# Patient Record
Sex: MALE | Race: OTHER | HISPANIC OR LATINO
[De-identification: names, ages, dates, MRNs, and addresses within clinical notes are randomized per-mention and may not be internally consistent; named-entity substitution may affect disease eponyms.]

---

## 2017-12-15 ENCOUNTER — APPOINTMENT (OUTPATIENT)
Dept: INTERNAL MEDICINE | Facility: CLINIC | Age: 27
End: 2017-12-15

## 2018-02-16 ENCOUNTER — OUTPATIENT (OUTPATIENT)
Dept: OUTPATIENT SERVICES | Facility: HOSPITAL | Age: 28
LOS: 1 days | Discharge: HOME | End: 2018-02-16

## 2018-02-16 ENCOUNTER — APPOINTMENT (OUTPATIENT)
Dept: INTERNAL MEDICINE | Facility: CLINIC | Age: 28
End: 2018-02-16

## 2018-02-16 ENCOUNTER — LABORATORY RESULT (OUTPATIENT)
Age: 28
End: 2018-02-16

## 2018-02-16 VITALS
DIASTOLIC BLOOD PRESSURE: 70 MMHG | HEIGHT: 64.5 IN | WEIGHT: 182 LBS | SYSTOLIC BLOOD PRESSURE: 109 MMHG | BODY MASS INDEX: 30.69 KG/M2 | HEART RATE: 58 BPM

## 2018-02-16 DIAGNOSIS — Z87.891 PERSONAL HISTORY OF NICOTINE DEPENDENCE: ICD-10-CM

## 2018-02-16 DIAGNOSIS — Z87.39 PERSONAL HISTORY OF OTHER DISEASES OF THE MUSCULOSKELETAL SYSTEM AND CONNECTIVE TISSUE: ICD-10-CM

## 2018-02-26 DIAGNOSIS — L65.9 NONSCARRING HAIR LOSS, UNSPECIFIED: ICD-10-CM

## 2018-02-26 DIAGNOSIS — Z23 ENCOUNTER FOR IMMUNIZATION: ICD-10-CM

## 2018-03-29 ENCOUNTER — EMERGENCY (EMERGENCY)
Facility: HOSPITAL | Age: 28
LOS: 0 days | Discharge: HOME | End: 2018-03-29
Attending: EMERGENCY MEDICINE | Admitting: INTERNAL MEDICINE

## 2018-03-29 VITALS
DIASTOLIC BLOOD PRESSURE: 82 MMHG | SYSTOLIC BLOOD PRESSURE: 118 MMHG | OXYGEN SATURATION: 98 % | RESPIRATION RATE: 18 BRPM | TEMPERATURE: 98 F | HEART RATE: 61 BPM

## 2018-03-29 DIAGNOSIS — R10.32 LEFT LOWER QUADRANT PAIN: ICD-10-CM
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
## 2018-03-29 DIAGNOSIS — S39.011A STRAIN OF MUSCLE, FASCIA AND TENDON OF ABDOMEN, INITIAL ENCOUNTER: ICD-10-CM
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
## 2018-03-29 NOTE — ED PROVIDER NOTE - NS ED ROS FT
Review of Systems:  	•	CONSTITUTIONAL - no fever, no diaphoresis, no chills  	•	SKIN - no rash  	•	HEMATOLOGIC - no bleeding, no bruising  	•	RESPIRATORY - no shortness of breath, no cough  	•	CARDIAC - no chest pain, no palpitations  	•	GI - + abd pain, no nausea, no vomiting, no diarrhea, no constipation  	•	GENITO-URINARY - no dysuria; no hematuria, no increased urinary frequency, no testicular pain  	•	MUSCULOSKELETAL - no joint paint, no swelling, no redness  	•	NEUROLOGIC - no weakness, no headache, no paresthesias, no LOC

## 2018-03-29 NOTE — ED PROVIDER NOTE - CHPI ED SYMPTOMS NEG
no nausea/no dysuria/no hematuria/no chills/no blood in stool/no diarrhea/no fever/no vomiting/no abdominal distension/no burning urination
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
## 2018-03-29 NOTE — ED PROVIDER NOTE - PHYSICAL EXAMINATION
VITAL SIGNS: I have reviewed nursing notes and confirm.  CONSTITUTIONAL: Well-developed; well-nourished; in no acute distress.  SKIN: Skin exam is warm and dry, no acute rash.  HEAD: Normocephalic; atraumatic.  EYES: PERRL, EOM intact; conjunctiva and sclera clear.  ENT: No nasal discharge; airway clear.   NECK: Supple; non tender.  CARD: S1, S2 normal; no murmurs, gallops, or rubs. Regular rate and rhythm.  RESP: Clear to auscultation bilaterally. No wheezes, rales or rhonchi.  ABD: Normal bowel sounds; soft; non-distended; non-tender. No hernia appreciated. No CVA tenderness.    chaperoned by nurse White: Normal appearing male genitalia. No hernia appreciated. No testicular tenderness. No discharge noted.   No erythema or swelling noted.   EXT: Normal ROM.   LYMPH: No acute cervical adenopathy.  NEURO: Alert, oriented. Grossly unremarkable. No focal deficits.  PSYCH: Cooperative, appropriate.

## 2018-03-29 NOTE — ED PROVIDER NOTE - ATTENDING CONTRIBUTION TO CARE
26 yo  M who is a  presents with left lower abd/groin pain that he only experiences with movement, lifting something heavy or twisting.  No dysuria, no urinary complaints.  No diarrhea, no fevers, no chills, no sob.  No  complaints, no testicle pain or swelling or redness.  Physician who speaks [place] translated for me.    Pt has had similar symptoms in the past as well only with movement or heavy lifting.  Pt has NO PAIN AT REST.  No nausea, no vomiting.    Awake, alert.  Neck supple.  Abd soft with Mild LLQ/Inguinal area mild tenderness, nor ebound, no guarding, no hernmia appreciated.  : no testicle swelling or erythema, no redness, no swelling, no hernia appreciated.  a/p:  History is concerning for possible muscle strain vs. hernia.  I spoke to pt at length about possibility of hernia.  Pt given strict return precautions.  Pt given surgery clinic follow up.  Pt given strict dc insturctions.  Pt is very well appearing, nontoxic.

## 2018-03-29 NOTE — ED PROVIDER NOTE - OBJECTIVE STATEMENT
26 yo M with no pmhx 26 yo M with no pmhx presenting with pain to left lower quadrant/inguinal region that started yesterday morning while heavy lifting at work. States pain is worse with movement and better with rest. Pt reports having this similar pain 3 weeks ago that occurred while heavy lifting but resolved on its own.    Pt Welsh speaking received history with Welsh speaking nurse Cindy pressley with me.

## 2018-03-29 NOTE — ED PROVIDER NOTE - MEDICAL DECISION MAKING DETAILS
Pt with muscle strain vs. hernia.  pt dc [facility] outpatient surgery follow up.  pt nontoxic, well appearing.  pt understands importance of follow up.  pt given strict return precuations.  no fevers, no chills, no nausea, no vomiting.  pt has NO PAIN at rest.  Physician who speaks [place]serr[place]ian translated and provided dc instructions.  pt to follow up with surgery

## 2018-04-02 ENCOUNTER — APPOINTMENT (OUTPATIENT)
Dept: SURGERY | Facility: CLINIC | Age: 28
End: 2018-04-02
Payer: SUBSIDIZED

## 2018-04-02 DIAGNOSIS — S76.211A STRAIN OF ADDUCTOR MUSCLE, FASCIA AND TENDON OF RIGHT THIGH, INITIAL ENCOUNTER: ICD-10-CM

## 2018-04-02 PROCEDURE — 99203 OFFICE O/P NEW LOW 30 MIN: CPT

## 2018-08-09 ENCOUNTER — OUTPATIENT (OUTPATIENT)
Dept: OUTPATIENT SERVICES | Facility: HOSPITAL | Age: 28
LOS: 1 days | Discharge: HOME | End: 2018-08-09

## 2018-08-09 ENCOUNTER — APPOINTMENT (OUTPATIENT)
Dept: INTERNAL MEDICINE | Facility: CLINIC | Age: 28
End: 2018-08-09

## 2018-08-09 VITALS
WEIGHT: 186 LBS | HEIGHT: 64 IN | DIASTOLIC BLOOD PRESSURE: 71 MMHG | SYSTOLIC BLOOD PRESSURE: 118 MMHG | TEMPERATURE: 97.2 F | HEART RATE: 55 BPM | BODY MASS INDEX: 31.76 KG/M2

## 2018-08-09 DIAGNOSIS — K08.89 OTHER SPECIFIED DISORDERS OF TEETH AND SUPPORTING STRUCTURES: ICD-10-CM

## 2018-08-09 DIAGNOSIS — S76.212A STRAIN OF ADDUCTOR MUSCLE, FASCIA AND TENDON OF LEFT THIGH, INITIAL ENCOUNTER: ICD-10-CM

## 2018-08-09 DIAGNOSIS — R10.32 LEFT LOWER QUADRANT PAIN: ICD-10-CM

## 2018-08-09 NOTE — HISTORY OF PRESENT ILLNESS
[FreeTextEntry1] : Hair loss is getting worse  [de-identified] : 29 yo Male patient with PMH of hair loss presents to clinic for follow up. Pt states hair loss is getting worse than before he tried medication- he cannot remember the name of medication. Also pt complaints of dry skin on left heel. Pt has pain on left lower extremities. It started about 5 years ago and pt does not remember any trauma. Pt also wants blood work done to check if he is healthy.

## 2018-08-09 NOTE — HISTORY OF PRESENT ILLNESS
[FreeTextEntry1] : Hair loss is getting worse  [de-identified] : 27 yo Male patient with PMH of hair loss presents to clinic for follow up. Pt states hair loss is getting worse than before he tried medication- he cannot remember the name of medication. Also pt complaints of dry skin on left heel. Pt has pain on left lower extremities. It started about 5 years ago and pt does not remember any trauma. Pt also wants blood work done to check if he is healthy.

## 2018-08-09 NOTE — PLAN
[FreeTextEntry1] : # Hair loss\par - Rogaine advised again. Refer to derma if no improvement. \par \par # dry skin on left foot\par - Ammonium lactate lotion. \par \par # Vitamin d Def:\par OTC vitamin D advised. \par \par # HLD: Diet and exercise advised. \par \par RTC in 6 months.

## 2018-08-09 NOTE — END OF VISIT
[FreeTextEntry3] : Case d/w MS (Ginette)  [Time Spent: ___ minutes] : I have spent [unfilled] minutes of face to face time with the patient

## 2018-08-09 NOTE — REVIEW OF SYSTEMS
[Negative] : Heme/Lymph [FreeTextEntry9] : left tibia medial side of leg [de-identified] : dry skin on left foot

## 2018-08-09 NOTE — REVIEW OF SYSTEMS
[Negative] : Heme/Lymph [FreeTextEntry9] : left tibia medial side of leg [de-identified] : dry skin on left foot

## 2018-11-15 ENCOUNTER — APPOINTMENT (OUTPATIENT)
Dept: INTERNAL MEDICINE | Facility: CLINIC | Age: 28
End: 2018-11-15

## 2018-11-15 ENCOUNTER — LABORATORY RESULT (OUTPATIENT)
Age: 28
End: 2018-11-15

## 2018-11-15 ENCOUNTER — OUTPATIENT (OUTPATIENT)
Dept: OUTPATIENT SERVICES | Facility: HOSPITAL | Age: 28
LOS: 1 days | Discharge: HOME | End: 2018-11-15

## 2018-11-15 VITALS
TEMPERATURE: 97.9 F | DIASTOLIC BLOOD PRESSURE: 74 MMHG | WEIGHT: 183 LBS | BODY MASS INDEX: 31.24 KG/M2 | HEIGHT: 64 IN | HEART RATE: 67 BPM | SYSTOLIC BLOOD PRESSURE: 115 MMHG

## 2018-11-15 DIAGNOSIS — L65.9 NONSCARRING HAIR LOSS, UNSPECIFIED: ICD-10-CM

## 2018-11-15 DIAGNOSIS — E55.9 VITAMIN D DEFICIENCY, UNSPECIFIED: ICD-10-CM

## 2018-11-15 DIAGNOSIS — E78.5 HYPERLIPIDEMIA, UNSPECIFIED: ICD-10-CM

## 2018-11-15 DIAGNOSIS — Z00.00 ENCOUNTER FOR GENERAL ADULT MEDICAL EXAMINATION W/OUT ABNORMAL FINDINGS: ICD-10-CM

## 2018-11-15 LAB
BASOPHILS # BLD AUTO: 0.02 K/UL
BASOPHILS NFR BLD AUTO: 0.3 %
EOSINOPHIL # BLD AUTO: 0.07 K/UL
EOSINOPHIL NFR BLD AUTO: 1 %
HCT VFR BLD CALC: 51.3 %
HGB BLD-MCNC: 17.2 G/DL
IMM GRANULOCYTES NFR BLD AUTO: 0.4 %
LYMPHOCYTES # BLD AUTO: 2.59 K/UL
LYMPHOCYTES NFR BLD AUTO: 38.3 %
MAN DIFF?: NORMAL
MCHC RBC-ENTMCNC: 29.9 PG
MCHC RBC-ENTMCNC: 33.5 G/DL
MCV RBC AUTO: 89.2 FL
MONOCYTES # BLD AUTO: 0.49 K/UL
MONOCYTES NFR BLD AUTO: 7.2 %
NEUTROPHILS # BLD AUTO: 3.56 K/UL
NEUTROPHILS NFR BLD AUTO: 52.8 %
PLATELET # BLD AUTO: 263 K/UL
RBC # BLD: 5.75 M/UL
RBC # FLD: 12.7 %
WBC # FLD AUTO: 6.76 K/UL

## 2018-11-15 NOTE — REVIEW OF SYSTEMS
[Nail Changes] : nail changes [Negative] : Heme/Lymph [de-identified] : Black toe nail at RIGHT 1st digit

## 2018-11-15 NOTE — HISTORY OF PRESENT ILLNESS
[FreeTextEntry1] :  [id number] [FreeTextEntry2] : Rebeca [de-identified] : The patient is a 28 year old male who presents to the clinic for follow up examination. He reports having continued hair loss despite reattempting Rogain. He notices the hair loss when he wakes up in bed and in the shower mostly. He has no family Hx of hair loss. He also complains of toe nail changes, particular in his RIGHT great toe. He reports in part turning black. Otherwise his ROS is negative.\par

## 2018-11-15 NOTE — PLAN
[FreeTextEntry1] : This is a 28 year old male with a past medical history of hair loss. He reports having continued hair loss despite reattempting Rogain. He notices the hair loss when he wakes up in bed and in the shower mostly. He has no family Hx of hair loss. He also complains of toe nail changes, particular in his RIGHT great toe. He reports in part turning black. Otherwise his ROS is negative.\par \par Hair loss\par - No apparent hair loss of physical examination\par - Reattempted Rogain advised from last visit; advised to continue\par - He did not follow up with dermatology, as recommended\par - He did not complete the blood tests prior to this visit as well\par - Will check Thyroid studies \par \par Tinea ungum BL\par - Refer to podiatry\par \par HLD\par - Follow up with lipid panel\par - No on medications\par \par HCM\par - Flu vaccine received today\par - Will order CBC, CMP, A1c, TSH, lipid panel, Vit D, and ESR\par

## 2018-11-15 NOTE — PHYSICAL EXAM

## 2018-11-16 LAB
25(OH)D3 SERPL-MCNC: 23 NG/ML
ALBUMIN SERPL ELPH-MCNC: 4.9 G/DL
ALP BLD-CCNC: 93 U/L
ALT SERPL-CCNC: 38 U/L
ANION GAP SERPL CALC-SCNC: 12 MMOL/L
AST SERPL-CCNC: 25 U/L
BILIRUB SERPL-MCNC: 0.7 MG/DL
BUN SERPL-MCNC: 10 MG/DL
CALCIUM SERPL-MCNC: 9.6 MG/DL
CHLORIDE SERPL-SCNC: 100 MMOL/L
CHOLEST SERPL-MCNC: 225 MG/DL
CHOLEST/HDLC SERPL: 5 RATIO
CO2 SERPL-SCNC: 26 MMOL/L
CREAT SERPL-MCNC: 0.9 MG/DL
ERYTHROCYTE [SEDIMENTATION RATE] IN BLOOD BY WESTERGREN METHOD: 2 MM/HR
ESTIMATED AVERAGE GLUCOSE: 100 MG/DL
GLUCOSE SERPL-MCNC: 85 MG/DL
HBA1C MFR BLD HPLC: 5.1 %
HDLC SERPL-MCNC: 45 MG/DL
LDLC SERPL CALC-MCNC: 157 MG/DL
POTASSIUM SERPL-SCNC: 4.4 MMOL/L
PROT SERPL-MCNC: 7.4 G/DL
SODIUM SERPL-SCNC: 138 MMOL/L
TRIGL SERPL-MCNC: 207 MG/DL
TSH SERPL-ACNC: 1.03 UIU/ML

## 2018-11-19 DIAGNOSIS — Z23 ENCOUNTER FOR IMMUNIZATION: ICD-10-CM

## 2018-11-19 DIAGNOSIS — B35.1 TINEA UNGUIUM: ICD-10-CM

## 2018-11-19 DIAGNOSIS — L65.9 NONSCARRING HAIR LOSS, UNSPECIFIED: ICD-10-CM

## 2018-11-30 ENCOUNTER — OUTPATIENT (OUTPATIENT)
Dept: OUTPATIENT SERVICES | Facility: HOSPITAL | Age: 28
LOS: 1 days | Discharge: HOME | End: 2018-11-30

## 2018-11-30 ENCOUNTER — APPOINTMENT (OUTPATIENT)
Dept: PODIATRY | Facility: CLINIC | Age: 28
End: 2018-11-30
Payer: SUBSIDIZED

## 2018-11-30 VITALS
HEIGHT: 64 IN | BODY MASS INDEX: 33.12 KG/M2 | DIASTOLIC BLOOD PRESSURE: 74 MMHG | HEART RATE: 58 BPM | SYSTOLIC BLOOD PRESSURE: 124 MMHG | WEIGHT: 194 LBS

## 2018-11-30 DIAGNOSIS — M79.672 PAIN IN LEFT FOOT: ICD-10-CM

## 2018-11-30 DIAGNOSIS — M79.671 PAIN IN RIGHT FOOT: ICD-10-CM

## 2018-11-30 DIAGNOSIS — B35.1 TINEA UNGUIUM: ICD-10-CM

## 2018-11-30 DIAGNOSIS — B35.3 TINEA PEDIS: ICD-10-CM

## 2018-11-30 PROCEDURE — 99203 OFFICE O/P NEW LOW 30 MIN: CPT

## 2018-11-30 RX ORDER — CLOTRIMAZOLE AND BETAMETHASONE DIPROPIONATE 10; .5 MG/G; MG/G
1-0.05 CREAM TOPICAL
Qty: 1 | Refills: 5 | Status: ACTIVE | COMMUNITY
Start: 2018-11-30 | End: 1900-01-01

## 2018-11-30 RX ORDER — CLOTRIMAZOLE 10 MG/ML
1 SOLUTION TOPICAL
Qty: 1 | Refills: 5 | Status: ACTIVE | COMMUNITY
Start: 2018-11-30 | End: 1900-01-01

## 2018-12-04 PROBLEM — M79.671 ACUTE FOOT PAIN, RIGHT: Status: ACTIVE | Noted: 2018-12-04

## 2018-12-04 PROBLEM — B35.1 TINEA UNGUIUM: Status: ACTIVE | Noted: 2018-11-15

## 2018-12-04 PROBLEM — M79.672 ACUTE FOOT PAIN, LEFT: Status: ACTIVE | Noted: 2018-12-04

## 2018-12-05 DIAGNOSIS — B35.1 TINEA UNGUIUM: ICD-10-CM

## 2018-12-05 DIAGNOSIS — M79.671 PAIN IN RIGHT FOOT: ICD-10-CM

## 2019-01-11 ENCOUNTER — APPOINTMENT (OUTPATIENT)
Dept: PODIATRY | Facility: CLINIC | Age: 29
End: 2019-01-11

## 2019-02-14 ENCOUNTER — APPOINTMENT (OUTPATIENT)
Dept: INTERNAL MEDICINE | Facility: CLINIC | Age: 29
End: 2019-02-14

## 2019-03-12 ENCOUNTER — APPOINTMENT (OUTPATIENT)
Dept: DERMATOLOGY | Facility: CLINIC | Age: 29
End: 2019-03-12

## 2020-02-26 NOTE — ED ADULT NURSE NOTE - HARM RISK FACTORS
no
PAST SURGICAL HISTORY:  History of Arthroscopy of Knee bilateral    S/P Tonsillectomy childhood    Status Post Arthroscopy right shoulder

## 2020-11-21 ENCOUNTER — EMERGENCY (EMERGENCY)
Facility: HOSPITAL | Age: 30
LOS: 0 days | Discharge: HOME | End: 2020-11-21
Attending: EMERGENCY MEDICINE | Admitting: EMERGENCY MEDICINE
Payer: SUBSIDIZED

## 2020-11-21 VITALS
RESPIRATION RATE: 17 BRPM | DIASTOLIC BLOOD PRESSURE: 66 MMHG | HEART RATE: 69 BPM | TEMPERATURE: 96 F | SYSTOLIC BLOOD PRESSURE: 133 MMHG | OXYGEN SATURATION: 95 %

## 2020-11-21 DIAGNOSIS — J02.9 ACUTE PHARYNGITIS, UNSPECIFIED: ICD-10-CM

## 2020-11-21 PROCEDURE — 99283 EMERGENCY DEPT VISIT LOW MDM: CPT

## 2020-11-21 RX ORDER — DEXAMETHASONE 0.5 MG/5ML
10 ELIXIR ORAL ONCE
Refills: 0 | Status: COMPLETED | OUTPATIENT
Start: 2020-11-21 | End: 2020-11-21

## 2020-11-21 RX ADMIN — Medication 10 MILLIGRAM(S): at 10:33

## 2020-11-21 NOTE — ED PROVIDER NOTE - PATIENT PORTAL LINK FT
You can access the FollowMyHealth Patient Portal offered by [facility] by registering at the following website: [email or web address]. By joining [employer]’s FollowMyHealth portal, you will also be able to view your health information using other applications (apps) compatible with our system.

## 2020-11-21 NOTE — ED PROVIDER NOTE - NSFOLLOWUPCLINICS_GEN_ALL_ED_FT
[facility]  ENT  [address], [address]nd [place] [address]  Phone: [phone]  Fax:   Follow Up Time:

## 2020-11-21 NOTE — ED PROVIDER NOTE - NSFOLLOWUPINSTRUCTIONS_ED_ALL_ED_FT
Sore Throat  A sore throat is pain, burning, irritation, or scratchiness in the throat. When you have a sore throat, you may feel pain or tenderness in your throat when you swallow or talk.    Many things can cause a sore throat, including:  An infection.  Seasonal allergies.  Dryness in the air.  Irritants, such as smoke or pollution.  Gastroesophageal reflux disease (GERD).  A tumor.  A sore throat is often the first sign of another sickness. It may happen with other symptoms, such as coughing, sneezing, fever, and swollen neck glands. Most sore throats go away without medical treatment.    Follow these instructions at home:  Image Image   Take over-the-counter medicines only as told by your health care provider.  Drink enough fluids to keep your urine clear or pale yellow.  Rest as needed.  To help with pain, try:  Sipping warm liquids, such as broth, herbal tea, or warm water.  Eating or drinking cold or frozen liquids, such as frozen ice pops.  Gargling with a salt-water mixture 3–4 times a day or as needed. To make a salt-water mixture, completely dissolve ½–1 tsp of salt in 1 cup of warm water.  Sucking on hard candy or throat lozenges.  Putting a cool-mist humidifier in your bedroom at night to moisten the air.  Sitting in the bathroom with the door closed for 5–10 minutes while you run hot water in the shower.  Do not use any tobacco products, such as cigarettes, chewing tobacco, and e-cigarettes. If you need help quitting, ask your health care provider.  Contact a health care provider if:  You have a fever for more than 2–3 days.  You have symptoms that last (are persistent) for more than 2–3 days.  Your throat does not get better within 7 days.  You have a fever and your symptoms suddenly get worse.  Get help right away if:  You have difficulty breathing.  You cannot swallow fluids, soft foods, or your saliva.  You have increased swelling in your throat or neck.  You have persistent nausea and vomiting.  This information is not intended to replace advice given to you by your health care provider. Make sure you discuss any questions you have with your health care provider.

## 2020-11-21 NOTE — ED PROVIDER NOTE - OBJECTIVE STATEMENT
31 y/o male no PMHx presents to the ED c/o "I have painful swallowing for about 3 weeks. I was seen by my doctor and have been taking Amoxil for 3 days." no fever/ chills/ cough/ sob/ cp/ loss of urine or stool

## 2020-11-21 NOTE — ED PROVIDER NOTE - ATTENDING CONTRIBUTION TO CARE
30 year old male, no pmhx, presenting with 3 weeks of painful swallowing and throat pain. Given PO amox with only mild improvement 3 days ago by PMD. Denies having this before and otherwise denies fevers, cough, dyspnea, N/V, penile discharge/lesions or any other complaints.    Vital Signs: I have reviewed the initial vital signs.  Constitutional: NAD, well-nourished, appears stated age, no acute distress.  HEENT: Airway patent, moist MM, (+) mild b/l tonsillar erythema, no exudate or petechiae. EOMI, PERRLA. No Uvular deviation  CV: regular rate, regular rhythm, well-perfused extremities, 2+ b/l DP and radial pulses equal.  Lungs: BCTA, no increased WOB.  ABD: NTND, no guarding or rebound, no pulsatile mass, no hernias.   MSK: Neck supple, nontender, nl ROM, no stepoff. Chest nontender. Back nontender in TLS spine or to b/l bony structures or flanks. Ext nontender, nl rom, no deformity.   INTEG: Skin warm, dry, no rash.  NEURO: A&Ox3, normal strength, nl sensation throughout, normal speech.   PSYCH: Calm, cooperative, normal affect and interaction.    Airway patent, patient clearing secretions without difficulty. No signs of strep on exam and patient already on amoxicillin. Will give PO decadron, re-eval.

## 2020-11-21 NOTE — ED PROVIDER NOTE - CLINICAL SUMMARY MEDICAL DECISION MAKING FREE TEXT BOX
Patient presented with pain with swallowing x 3 weeks. Otherwise afebrile, HD stable, airway patent without evidence of strep on exam based on centor criteria. Clearing secretions without difficulty, normal O2 saturation, speaking full sentences, no uvular deviation or voice changes. Given PO decadron with significant improvement. Patient already on amoxicillin x 3 days. Given the above, will recommend continuing amoxicillin, outpatient ENT follow up. Patient agreeable with plan. Able to tolerate PO in ED without difficulty. Agrees to return to ED for any new or worsening symptoms.

## 2020-11-30 ENCOUNTER — APPOINTMENT (OUTPATIENT)
Dept: OTOLARYNGOLOGY | Facility: CLINIC | Age: 30
End: 2020-11-30

## 2020-11-30 ENCOUNTER — APPOINTMENT (OUTPATIENT)
Dept: OTOLARYNGOLOGY | Facility: CLINIC | Age: 30
End: 2020-11-30
Payer: SUBSIDIZED

## 2020-11-30 ENCOUNTER — OUTPATIENT (OUTPATIENT)
Dept: OUTPATIENT SERVICES | Facility: HOSPITAL | Age: 30
LOS: 1 days | Discharge: HOME | End: 2020-11-30
Payer: SUBSIDIZED

## 2020-11-30 VITALS — TEMPERATURE: 97.8 F

## 2020-11-30 DIAGNOSIS — R49.0 DYSPHONIA: ICD-10-CM

## 2020-11-30 DIAGNOSIS — H61.22 IMPACTED CERUMEN, LEFT EAR: ICD-10-CM

## 2020-11-30 PROCEDURE — 71046 X-RAY EXAM CHEST 2 VIEWS: CPT | Mod: 26

## 2020-11-30 PROCEDURE — 31575 DIAGNOSTIC LARYNGOSCOPY: CPT

## 2020-11-30 PROCEDURE — 99204 OFFICE O/P NEW MOD 45 MIN: CPT | Mod: 25

## 2020-11-30 RX ORDER — PREDNISONE 10 MG/1
10 TABLET ORAL
Qty: 50 | Refills: 0 | Status: ACTIVE | COMMUNITY
Start: 2020-11-30 | End: 1900-01-01

## 2020-11-30 NOTE — HISTORY OF PRESENT ILLNESS
[de-identified] : [place]al[place] ID# [id number] \par Patient presents today with c/o hoarseness. Hoarseness has been present for 1 month. No dysphagia to solids or liquids. Sore throat for about 3 days. He quit smoking about 1 year ago. He states he received an injection from primary and antibiotics for 3 days with no improvement. He denies reflux or PND. No odynophagia.no fever

## 2020-11-30 NOTE — PHYSICAL EXAM
[Midline] : trachea located in midline position [Normal] : no rashes [de-identified] : left cerumen impaction

## 2020-12-08 ENCOUNTER — OUTPATIENT (OUTPATIENT)
Dept: OUTPATIENT SERVICES | Facility: HOSPITAL | Age: 30
LOS: 1 days | Discharge: HOME | End: 2020-12-08
Payer: SUBSIDIZED

## 2020-12-08 DIAGNOSIS — R49.0 DYSPHONIA: ICD-10-CM

## 2020-12-08 PROCEDURE — 70491 CT SOFT TISSUE NECK W/DYE: CPT | Mod: 26

## 2023-03-09 ENCOUNTER — EMERGENCY (EMERGENCY)
Facility: HOSPITAL | Age: 33
LOS: 0 days | Discharge: HOME | End: 2023-03-09
Attending: STUDENT IN AN ORGANIZED HEALTH CARE EDUCATION/TRAINING PROGRAM
Payer: MEDICAID

## 2023-03-09 VITALS
RESPIRATION RATE: 20 BRPM | DIASTOLIC BLOOD PRESSURE: 81 MMHG | OXYGEN SATURATION: 98 % | WEIGHT: 210.1 LBS | SYSTOLIC BLOOD PRESSURE: 141 MMHG | HEIGHT: 65 IN | HEART RATE: 57 BPM | TEMPERATURE: 98 F

## 2023-03-09 DIAGNOSIS — R51.9 HEADACHE, UNSPECIFIED: ICD-10-CM

## 2023-03-09 DIAGNOSIS — Z87.820 PERSONAL HISTORY OF TRAUMATIC BRAIN INJURY: ICD-10-CM

## 2023-03-09 DIAGNOSIS — J32.9 CHRONIC SINUSITIS, UNSPECIFIED: ICD-10-CM

## 2023-03-09 LAB
ALBUMIN SERPL ELPH-MCNC: 4.8 G/DL — SIGNIFICANT CHANGE UP (ref 3.5–5.2)
ALP SERPL-CCNC: 95 U/L — SIGNIFICANT CHANGE UP (ref 30–115)
ALT FLD-CCNC: 38 U/L — SIGNIFICANT CHANGE UP (ref 0–41)
ANION GAP SERPL CALC-SCNC: 9 MMOL/L — SIGNIFICANT CHANGE UP (ref 7–14)
AST SERPL-CCNC: 23 U/L — SIGNIFICANT CHANGE UP (ref 0–41)
BASOPHILS # BLD AUTO: 0.03 K/UL — SIGNIFICANT CHANGE UP (ref 0–0.2)
BASOPHILS NFR BLD AUTO: 0.3 % — SIGNIFICANT CHANGE UP (ref 0–1)
BILIRUB SERPL-MCNC: 0.7 MG/DL — SIGNIFICANT CHANGE UP (ref 0.2–1.2)
BUN SERPL-MCNC: 12 MG/DL — SIGNIFICANT CHANGE UP (ref 10–20)
CALCIUM SERPL-MCNC: 9.3 MG/DL — SIGNIFICANT CHANGE UP (ref 8.4–10.5)
CHLORIDE SERPL-SCNC: 104 MMOL/L — SIGNIFICANT CHANGE UP (ref 98–110)
CO2 SERPL-SCNC: 25 MMOL/L — SIGNIFICANT CHANGE UP (ref 17–32)
CREAT SERPL-MCNC: 0.9 MG/DL — SIGNIFICANT CHANGE UP (ref 0.7–1.5)
EGFR: 116 ML/MIN/1.73M2 — SIGNIFICANT CHANGE UP
EOSINOPHIL # BLD AUTO: 0.12 K/UL — SIGNIFICANT CHANGE UP (ref 0–0.7)
EOSINOPHIL NFR BLD AUTO: 1.3 % — SIGNIFICANT CHANGE UP (ref 0–8)
GLUCOSE SERPL-MCNC: 90 MG/DL — SIGNIFICANT CHANGE UP (ref 70–99)
HCT VFR BLD CALC: 47.4 % — SIGNIFICANT CHANGE UP (ref 42–52)
HGB BLD-MCNC: 16.7 G/DL — SIGNIFICANT CHANGE UP (ref 14–18)
IMM GRANULOCYTES NFR BLD AUTO: 0.3 % — SIGNIFICANT CHANGE UP (ref 0.1–0.3)
LYMPHOCYTES # BLD AUTO: 2.71 K/UL — SIGNIFICANT CHANGE UP (ref 1.2–3.4)
LYMPHOCYTES # BLD AUTO: 28.2 % — SIGNIFICANT CHANGE UP (ref 20.5–51.1)
MCHC RBC-ENTMCNC: 30.9 PG — SIGNIFICANT CHANGE UP (ref 27–31)
MCHC RBC-ENTMCNC: 35.2 G/DL — SIGNIFICANT CHANGE UP (ref 32–37)
MCV RBC AUTO: 87.6 FL — SIGNIFICANT CHANGE UP (ref 80–94)
MONOCYTES # BLD AUTO: 0.52 K/UL — SIGNIFICANT CHANGE UP (ref 0.1–0.6)
MONOCYTES NFR BLD AUTO: 5.4 % — SIGNIFICANT CHANGE UP (ref 1.7–9.3)
NEUTROPHILS # BLD AUTO: 6.19 K/UL — SIGNIFICANT CHANGE UP (ref 1.4–6.5)
NEUTROPHILS NFR BLD AUTO: 64.5 % — SIGNIFICANT CHANGE UP (ref 42.2–75.2)
NRBC # BLD: 0 /100 WBCS — SIGNIFICANT CHANGE UP (ref 0–0)
PLATELET # BLD AUTO: 248 K/UL — SIGNIFICANT CHANGE UP (ref 130–400)
POTASSIUM SERPL-MCNC: 4.6 MMOL/L — SIGNIFICANT CHANGE UP (ref 3.5–5)
POTASSIUM SERPL-SCNC: 4.6 MMOL/L — SIGNIFICANT CHANGE UP (ref 3.5–5)
PROT SERPL-MCNC: 7.7 G/DL — SIGNIFICANT CHANGE UP (ref 6–8)
RBC # BLD: 5.41 M/UL — SIGNIFICANT CHANGE UP (ref 4.7–6.1)
RBC # FLD: 12.5 % — SIGNIFICANT CHANGE UP (ref 11.5–14.5)
SODIUM SERPL-SCNC: 138 MMOL/L — SIGNIFICANT CHANGE UP (ref 135–146)
WBC # BLD: 9.6 K/UL — SIGNIFICANT CHANGE UP (ref 4.8–10.8)
WBC # FLD AUTO: 9.6 K/UL — SIGNIFICANT CHANGE UP (ref 4.8–10.8)

## 2023-03-09 PROCEDURE — 80053 COMPREHEN METABOLIC PANEL: CPT

## 2023-03-09 PROCEDURE — 70450 CT HEAD/BRAIN W/O DYE: CPT | Mod: 26,MA

## 2023-03-09 PROCEDURE — 70450 CT HEAD/BRAIN W/O DYE: CPT | Mod: MA

## 2023-03-09 PROCEDURE — 99284 EMERGENCY DEPT VISIT MOD MDM: CPT | Mod: 25

## 2023-03-09 PROCEDURE — 85025 COMPLETE CBC W/AUTO DIFF WBC: CPT

## 2023-03-09 PROCEDURE — 96374 THER/PROPH/DIAG INJ IV PUSH: CPT

## 2023-03-09 PROCEDURE — 99285 EMERGENCY DEPT VISIT HI MDM: CPT

## 2023-03-09 PROCEDURE — 36415 COLL VENOUS BLD VENIPUNCTURE: CPT

## 2023-03-09 RX ORDER — ACETAMINOPHEN 500 MG
650 TABLET ORAL ONCE
Refills: 0 | Status: COMPLETED | OUTPATIENT
Start: 2023-03-09 | End: 2023-03-09

## 2023-03-09 RX ORDER — METOCLOPRAMIDE HCL 10 MG
10 TABLET ORAL ONCE
Refills: 0 | Status: COMPLETED | OUTPATIENT
Start: 2023-03-09 | End: 2023-03-09

## 2023-03-09 RX ORDER — SODIUM CHLORIDE 9 MG/ML
1000 INJECTION, SOLUTION INTRAVENOUS ONCE
Refills: 0 | Status: COMPLETED | OUTPATIENT
Start: 2023-03-09 | End: 2023-03-09

## 2023-03-09 RX ADMIN — Medication 650 MILLIGRAM(S): at 10:07

## 2023-03-09 RX ADMIN — SODIUM CHLORIDE 1000 MILLILITER(S): 9 INJECTION, SOLUTION INTRAVENOUS at 10:17

## 2023-03-09 RX ADMIN — Medication 10 MILLIGRAM(S): at 10:08
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
## 2023-03-09 NOTE — CONSULT NOTE ADULT - ASSESSMENT
A/P   Neurologically stable  GCS 15  Mild left side HA  CTH; no evidence of acute brain hemorrhage, scattered probably calcifications findings, non conclusive  No neurosurgical intervention   Pain Management   May go home from Neurosurgery Perspective  Avoid alcohol  Do not smoke  If HA persists recommending Neurology follow up as an outpatient   Disposition as per ER MD    Discussed with Dr Hogan, Neurosurgery Chair

## 2023-03-09 NOTE — ED PROVIDER NOTE - PHYSICAL EXAMINATION
Vital Signs: I have reviewed the initial vital signs.  Constitutional: well-nourished, appears stated age, no acute distress.  HEENT: Airway patent, moist MM, EOMI, PERRLA.  CV: regular rate, regular rhythm,   Lungs: Clear to ascultation bilaterally, no increased work of breathing.  ABD: Non-tender, Non-distended, no hernias,   MSK: Neck supple, nontender, normal range of motion,   INTEG: Skin warm, dry, no rash.  NEURO: AAO x 3, normal speech, no facial asymmetry, negative pronator drift, no ataxia, no nystagmus, peripheral vision intact, sensory equal and intact.  PSYCH: Calm, cooperative, normal affect and interaction.

## 2023-03-09 NOTE — ED PROVIDER NOTE - NSFOLLOWUPINSTRUCTIONS_ED_ALL_ED_FT
Infección de los senos paranasales en los adultos    Sinus Infection, Adult    A person's face, and a person's face showing an internal view of the sinuses. Here the sinuses contain mucus.   La infección de los senos paranasales es el dolor y la hinchazón (inflamación) de los senos paranasales. Los senos paranasales son espacios vacíos en los huesos alrededor del dinesh. Estos se encuentran en los siguientes lugares:  •Alrededor de los ojos.      •En la mitad de la frente.      •Detrás de la nariz.      •En los pómulos.      Los senos paranasales y las fosas nasales están cubiertos de un líquido llamado mucosidad. La mucosidad drena a través de los senos paranasales. La hinchazón puede atrapar mucosidad en los senos paranasales. [place] permite que se desarrollen gérmenes (bacterias, virus u hongos), lo que produce infecciones. La mayoría de las veces, la causa de esta afección es un virus.      ¿Cuáles son las causas?    •Alergias.       •Asma.       •Gérmenes.      •Objetos que obstruyen la nariz o los senos paranasales.      •Crecimientos en el interior de la nariz (pólipos nasales).      •Sustancias químicas o irritantes que están presentes en el aire.      •Un hongo. [place] es poco frecuente.        ¿Qué incrementa el riesgo?    •Tener debilitado el sistema de defensa del cuerpo (sistema inmunitario).      •Nadar o bucear mucho.      •Usar aerosoles nasales con mucha frecuencia.      •Fumar.        ¿Cuáles son los signos o síntomas?    Los principales síntomas de esta afección son dolor y sensación de presión alrededor de los senos paranasales. Otros síntomas incluyen:  •Nariz tapada (congestión nasal). [place] puede dificultar la respiración por la nariz.      •Goteo nasal (drenaje).      •Dolor, hinchazón y calor en los senos paranasales.      •Tos que puede empeorar por la noche.      •Incapacidad de sentir olores y sabores.      •Mucosidad que se acumula en la garganta o la parte posterior de la nariz (goteo posnasal). [place] puede causar dolor de garganta o mal aliento.      •Estar muy cansado (fatigado).      •Fiebre.        ¿Cómo se diagnostica?    •Los síntomas.       •Eva antecedentes médicos.       •Un examen físico.     •Pruebas para averiguar si la afección es de corta duración (aguda) o de larga duración (crónica). El médico puede:  •Revisarle la nariz para detectar crecimientos (pólipos).      •Revisarle los senos paranasales con nyla herramienta que tiene nyla nicho en un extremo (endoscopio).      •Revisar si tiene alergias o gérmenes.      •Hacerle pruebas de diagnóstico por imágenes, yan nyla resonancia magnética (RM) o exploración por tomografía computarizada (TC).          ¿Cómo se trata?    El tratamiento de esta afección depende de la causa y si es de corta o de larga duración.•Si la causa es un virus, los síntomas deberían desaparecer solos en el término de 10 días. Pueden darle medicamentos para aliviar los síntomas. Incluyen lo siguiente:  •Medicamentos para encoger el tejido inflamado de la nariz.       •Un aerosol para tratar la hinchazón de las fosas nasales.       •Enjuagues que ayudan a eliminar la mucosidad espesa de la nariz (lavados con solución salina nasal).       •Medicamentos para tratar alergias (antihistamínicos).      •Analgésicos de venta christiane.      •Si la causa es nyla bacteria, es posible que el médico espere para averiguar si usted mejora sin tratamiento. Es posible que le den un antibiótico si usted tiene:  •Nyla infección grave.      •El sistema de defensa del organismo debilitado.        •Si la causa son crecimientos en la nariz, es posible que necesite nyla cirugía.        Siga estas instrucciones en govea casa:    Medicamentos     •[place], use o aplique los medicamentos de venta christiane y los recetados solamente yan se lo haya indicado el médico. Estos pueden incluir aerosoles nasales.      •Si le recetaron un antibiótico, tómelo yan se lo haya indicado el médico. No deje de tomarlo aunque comience a sentirse mejor.        Hidrátese y humidifique los ambientes   Three cups showing dark yellow, yellow, and pale yellow pee.   •Jil suficiente agua para mantener el pis (la orina) de color amarillo pálido.      •Use un humidificador de vapor frío para mantener la humedad de govea hogar por encima del 50 %.      •Inhale vapor rolando 10 a 15 minutos, de 3 a 4 veces al día, o yan se lo haya indicado el médico. Puede hacer esto en el baño con el vapor del [place] de la ducha.      •Trate de no exponerse al aire frío o seco.      Reposo     •Descanse todo lo posible.      •Duerma con la clifford levantada (elevada).      •Asegúrese de dormir lo suficiente cada noche.        Instrucciones generales   A person washing hands with soap and water.   •Póngase un paño caliente y húmedo en el dinesh 3 a 4 veces al día, o con la frecuencia indicada por el médico.      •Hágase lavados con solución salina nasal con la frecuencia que le haya indicado el médico.      •Lávese las laxmi frecuentemente con agua y jabón. Use un desinfectante para laxmi si no dispone de agua y jabón.      • No fume. Evite estar cerca de personas que fuman (fumador pasivo).      •Concurra a todas las visitas de seguimiento.        Comuníquese con un médico si:    •Tiene fiebre.      •Eva síntomas empeoran.      •Los síntomas no mejoran en el período de 10 días.        Solicite ayuda de inmediato si:    •Tiene un dolor de clifford muy intenso.      •No puede dejar de vomitar.      •Tiene dolor muy intenso o hinchazón en la monique del dinesh o los ojos.      •Tiene dificultad para leah.      •Se siente confundido.      •Tiene el marley rígido.      •Tiene dificultad para respirar.      Estos síntomas pueden indicar nyla emergencia. Solicite ayuda de inmediato. Llame al 911.   • No espere a leah si los síntomas desaparecen.       • No conduzca por eva propios medios hasta el hospital.         Resumen    •La infección de los senos paranasales es la hinchazón de los senos paranasales. Los senos paranasales son espacios vacíos en los huesos alrededor del dinesh.      •La causa de esta afección es la inflamación o hinchazón de los tejidos que están en el interior de la nariz. [place] hace que los gérmenes queden atrapados. Los gérmenes pueden provocar infecciones.      •Si le recetaron un antibiótico, tómelo yan se lo haya indicado el médico. No deje de tomarlo aunque comience a sentirse mejor.      •Concurra a todas las visitas de seguimiento.      Esta información no tiene yan fin reemplazar el consejo del médico. Asegúrese de hacerle al médico cualquier pregunta que tenga.
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
